# Patient Record
Sex: MALE | Race: BLACK OR AFRICAN AMERICAN | NOT HISPANIC OR LATINO | URBAN - METROPOLITAN AREA
[De-identification: names, ages, dates, MRNs, and addresses within clinical notes are randomized per-mention and may not be internally consistent; named-entity substitution may affect disease eponyms.]

---
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
## 2024-08-20 ENCOUNTER — EMERGENCY (EMERGENCY)
Facility: HOSPITAL | Age: 19
LOS: 0 days | Discharge: HOME | End: 2024-08-20
Attending: EMERGENCY MEDICINE
Payer: MEDICAID

## 2024-08-20 VITALS
DIASTOLIC BLOOD PRESSURE: 65 MMHG | OXYGEN SATURATION: 98 % | WEIGHT: 190.04 LBS | TEMPERATURE: 97 F | RESPIRATION RATE: 18 BRPM | HEART RATE: 89 BPM | SYSTOLIC BLOOD PRESSURE: 121 MMHG
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
## 2024-08-20 DIAGNOSIS — M25.512 PAIN IN LEFT SHOULDER: ICD-10-CM

## 2024-08-20 DIAGNOSIS — S43.005A UNSPECIFIED DISLOCATION OF LEFT SHOULDER JOINT, INITIAL ENCOUNTER: ICD-10-CM
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
## 2024-08-20 PROCEDURE — 73030 X-RAY EXAM OF SHOULDER: CPT | Mod: 26,LT

## 2024-08-20 PROCEDURE — 99284 EMERGENCY DEPT VISIT MOD MDM: CPT

## 2024-08-20 PROCEDURE — 99283 EMERGENCY DEPT VISIT LOW MDM: CPT | Mod: 25

## 2024-08-20 PROCEDURE — 73030 X-RAY EXAM OF SHOULDER: CPT | Mod: LT

## 2024-08-20 RX ORDER — IBUPROFEN 200 MG
600 TABLET ORAL ONCE
Refills: 0 | Status: COMPLETED | OUTPATIENT
Start: 2024-08-20 | End: 2024-08-20

## 2024-08-20 RX ADMIN — Medication 600 MILLIGRAM(S): at 16:50

## 2024-08-20 NOTE — ED PROVIDER NOTE - PHYSICAL EXAMINATION
generalized: alert, no distress  eyes: clear conjunctiva  msk: left shoulder- +anterior shoulder tenderness, no ac tenderness, no bony deformity, good rom of extremity, good cap refill, pulses distally in tact, no crepitation   skin: no bruising, no swelling, no lacerations   neuro: alert, oriented, no motor or sensory deficits, gait steady

## 2024-08-20 NOTE — ED PROVIDER NOTE - OBJECTIVE STATEMENT
17 y/o male presents to the ED with left shoulder pain after exercising with weights. patient denies any weakness or tingling to arm. patient right hand dominant. no neck or back pain . patient c/o pain with movement of shoulder.

## 2024-08-20 NOTE — ED ADULT NURSE NOTE - NSFALLUNIVINTERV_ED_ALL_ED
Bed/Stretcher in lowest position, wheels locked, appropriate side rails in place/Call bell, personal items and telephone in reach/Instruct patient to call for assistance before getting out of bed/chair/stretcher/Non-slip footwear applied when patient is off stretcher/[place] to call system/Physically safe environment - no spills, clutter or unnecessary equipment/Purposeful proactive rounding/Room/bathroom lighting operational, light cord in reach

## 2024-08-20 NOTE — ED PROVIDER NOTE - NSFOLLOWUPINSTRUCTIONS_ED_ALL_ED_FT
Our Emergency Department Referral Coordinators will be reaching out to you in the next 24-48 hours from 9:00am to 5:00pm to schedule a follow up appointment. Please expect a phone call from the hospital in that time frame. If you do not receive a call or if you have any questions or concerns, you can reach them at   [phone].      Shoulder Separation    A shoulder separation (acromioclavicular separation) is an injury to the connecting tissue (ligament) between the top of your shoulder blade (acromion) and your collarbone (clavicle).     The ligament may be stretched, partially torn, or completely torn.    A stretched ligament may not cause very much pain, and it does not move the collarbone out of place. A stretched ligament looks normal on an X-ray.  An injury that is a bit worse may partially tear a ligament and move the collarbone slightly out of place.  A serious injury completely tears both shoulder ligaments. This moves the collarbone severely out of position and changes the way that the shoulder looks (deformity).    CAUSES  The most common cause of a shoulder separation is falling on or receiving a blow to the top of the shoulder. Falling with an outstretched arm may also cause this injury.    RISK FACTORS  You may be at greater risk of a shoulder separation if:    You are male.  You are younger than age 35.  You play a contact sport, such as football or hockey.    SIGNS AND SYMPTOMS  The most common symptom of a shoulder separation is pain on the top of the shoulder after falling on it or receiving a blow to it. Other signs and symptoms include:    Shoulder deformity.  Swelling of the shoulder.  Decreased ability to move the shoulder.  Bruising on top of the shoulder.    DIAGNOSIS  Your health care provider may suspect a shoulder separation based on your symptoms and the details of a recent injury. A physical exam will be done. During this exam, the health care provider may:    Press on your shoulder.  Test the movement of your shoulder.  Ask you to hold a weight in your hand to see if the separation increases.  Do an X-ray.    TREATMENT  A stretch injury may require only a sling, pain medicine, and cold packs. This treatment may last for 2–12 weeks. You may also have physical therapy. A physical therapist will teach you to do daily exercises to strengthen your shoulder muscles and prevent stiffness.  A complete tear may require surgery to repair the torn ligament. After surgery, you will also require a sling, pain medicine, and cold packs. Recovery may take longer. You may also need more physical therapy.    HOME CARE INSTRUCTIONS  Take medicines only as directed by your health care provider.  Apply ice to the top of your shoulder:  Put ice in a plastic bag.  Place a towel between your skin and the bag.  Leave the ice on for 20 minutes, 2–3 times a day.  Wear your sling or splint as directed by your health care provider.   You may be able to remove your sling to do your physical therapy exercises.  Ask your health care provider when you can stop wearing the sling.  Do not do any activities that make your pain worse.  Do not lift anything that is heavier than 10 lb (4.5 kg) on the injured side of your body.  Ask your health care provider when you can return to athletic activities.    SEEK MEDICAL CARE IF:  Your pain medicine is not relieving your pain.  Your pain and stiffness are not improving after 2 weeks.  You are unable to do your physical therapy exercises because of pain or stiffness.    ADDITIONAL NOTES AND INSTRUCTIONS    Please follow up with your Primary MD in 24-48 hr.  Seek immediate medical care for any new/worsening signs or symptoms.

## 2024-08-20 NOTE — ED PROVIDER NOTE - PATIENT PORTAL LINK FT
You can access the FollowMyHealth Patient Portal offered by [facility] by registering at the following website: [email or web address]. By joining [employer]’s FollowMyHealth portal, you will also be able to view your health information using other applications (apps) compatible with our system.

## 2024-08-20 NOTE — ED PROVIDER NOTE - CLINICAL SUMMARY MEDICAL DECISION MAKING FREE TEXT BOX
X-ray was obtained.  Films interpreted.  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  Discussed with patient.  Recommend continue ice, Motrin as needed and sling placed.  Patient with follow-up orthopedics.

## 2024-08-21 PROBLEM — Z78.9 OTHER SPECIFIED HEALTH STATUS: Chronic | Status: ACTIVE | Noted: 2022-10-28

## 2024-08-23 ENCOUNTER — APPOINTMENT (OUTPATIENT)
Dept: ORTHOPEDIC SURGERY | Facility: CLINIC | Age: 19
End: 2024-08-23
Payer: MEDICAID

## 2024-08-23 VITALS — WEIGHT: 190 LBS | HEIGHT: 74 IN | BODY MASS INDEX: 24.38 KG/M2

## 2024-08-23 DIAGNOSIS — M25.512 PAIN IN LEFT SHOULDER: ICD-10-CM

## 2024-08-23 PROBLEM — Z00.00 ENCOUNTER FOR PREVENTIVE HEALTH EXAMINATION: Status: ACTIVE | Noted: 2024-08-23

## 2024-08-23 PROCEDURE — 99203 OFFICE O/P NEW LOW 30 MIN: CPT

## 2024-08-23 NOTE — ASSESSMENT
[FreeTextEntry1] : 18-year-old male here for evaluation of left shoulder pain.  Patient reports he was doing shoulders in the gym a few days ago.  Denies any specific incident of injury but reports of woken up with pain following his latest shoulder workout.  Denies any popping or clicking.  Denies any trauma or falls.  Denies any numbness or tingling.  Physical examination of the left shoulder: No swelling, ecchymosis, erythema appreciated.  Skin is intact.  No midline neck or paraspinal tenderness.  Mild tenderness of the anterior glenohumeral joint and lateral deltoid.  Good range of motion upon flexion and abduction of the left shoulder without any limitations.  4-5 strength.  Negative [place].  Negative [place]'s.  Negative drop arm testing.  Sensorimotor intact distally.  Neurovascularly intact.   X-rays of the left shoulder reviewed from the hospital system:  No acute fractures, subluxations, or dislocations.  Treatment plan as discussed:  My clinical suspicion is high for a tendinitis of the shoulder given the patient's history, physical examination findings, and x-ray findings.  I recommended anti-inflammatory medication. Patient agrees to taking Advil/Ibuprofen OTC as needed for pain. Benefits discussed. Confirmed no contraindication to NSAIDs.  Script for physical therapy provided for improved ROM and strengthening of surrounding musculature. Benefits discussed.  I recommended patient rest, ice, compress, and elevate the arm regularly. Encouraged activity modification as tolerable. Encouraged gentle range of motion to avoid stiffness.  All questions and concerns addressed to patient's satisfaction. Patient expresses full understanding of treatment plan. Patient will follow up with Peter in 6 weeks for further evaluation and treatment. will consider MRI in repeat evaluation if patient's symptoms do not improve.

## 2024-09-24 ENCOUNTER — APPOINTMENT (OUTPATIENT)
Dept: ORTHOPEDIC SURGERY | Facility: CLINIC | Age: 19
End: 2024-09-24

## 2025-02-06 ENCOUNTER — EMERGENCY (EMERGENCY)
Facility: HOSPITAL | Age: 20
LOS: 0 days | Discharge: HOME | End: 2025-02-06
Attending: EMERGENCY MEDICINE
Payer: MEDICAID

## 2025-02-06 VITALS
OXYGEN SATURATION: 97 % | SYSTOLIC BLOOD PRESSURE: 111 MMHG | DIASTOLIC BLOOD PRESSURE: 71 MMHG | RESPIRATION RATE: 16 BRPM | TEMPERATURE: 98 F | WEIGHT: 186.07 LBS | HEART RATE: 68 BPM

## 2025-02-06 VITALS
RESPIRATION RATE: 18 BRPM | DIASTOLIC BLOOD PRESSURE: 77 MMHG | OXYGEN SATURATION: 99 % | SYSTOLIC BLOOD PRESSURE: 125 MMHG | HEART RATE: 73 BPM | TEMPERATURE: 98 F

## 2025-02-06 DIAGNOSIS — J02.9 ACUTE PHARYNGITIS, UNSPECIFIED: ICD-10-CM

## 2025-02-06 DIAGNOSIS — R09.81 NASAL CONGESTION: ICD-10-CM

## 2025-02-06 DIAGNOSIS — R05.1 ACUTE COUGH: ICD-10-CM

## 2025-02-06 DIAGNOSIS — J06.9 ACUTE UPPER RESPIRATORY INFECTION, UNSPECIFIED: ICD-10-CM

## 2025-02-06 LAB
FLUAV AG NPH QL: SIGNIFICANT CHANGE UP
FLUBV AG NPH QL: SIGNIFICANT CHANGE UP
RSV RNA NPH QL NAA+NON-PROBE: SIGNIFICANT CHANGE UP
SARS-COV-2 RNA SPEC QL NAA+PROBE: SIGNIFICANT CHANGE UP

## 2025-02-06 PROCEDURE — 99283 EMERGENCY DEPT VISIT LOW MDM: CPT

## 2025-02-06 PROCEDURE — 0241U: CPT

## 2025-02-06 NOTE — ED PROVIDER NOTE - PATIENT PORTAL LINK FT
You can access the FollowMyHealth Patient Portal offered by [facility] by registering at the following website: [email or web address]. By joining [employer]’s FollowMyHealth portal, you will also be able to view your health information using other applications (apps) compatible with our system.

## 2025-02-06 NOTE — ED PROVIDER NOTE - OBJECTIVE STATEMENT
20 y/o male non smoker presents to the ED with cough, non productive, nasal congestion, sore throat over past few days. no fevers. patient states significant other with similar symptoms, given antibx. no rashes.

## 2025-02-06 NOTE — ED ADULT TRIAGE NOTE - CHIEF COMPLAINT QUOTE
pt in er for complaints of cough, sore throat, and stuffy nose for three days, pt stated cough is productive   pt stated his friend is sick with similar symptoms

## 2025-02-06 NOTE — ED PROVIDER NOTE - PHYSICAL EXAMINATION
Vital Signs: I have reviewed the initial vital signs.  Constitutional: well-nourished, no acute distress, normocephalic  Eyes: PERRLA, EOMI clear conjunctiva  ENT: MMM, TM b/l clear , oropharynx - clear wtihout exudates, no nasal congestion, no sinus tenderness  Respiratory: unlabored respiratory effort, clear to auscultation bilaterally  Musculoskeletal: supple neck,  no bony tenderness  Integumentary: warm, dry, no rash  Neurologic: awake, alert, steady gait

## 2025-08-30 ENCOUNTER — EMERGENCY (EMERGENCY)
Facility: HOSPITAL | Age: 20
LOS: 0 days | Discharge: HOME | End: 2025-08-30
Attending: EMERGENCY MEDICINE
Payer: MEDICAID

## 2025-08-30 VITALS
DIASTOLIC BLOOD PRESSURE: 76 MMHG | HEART RATE: 68 BPM | HEIGHT: 74 IN | TEMPERATURE: 98 F | WEIGHT: 195.11 LBS | SYSTOLIC BLOOD PRESSURE: 113 MMHG | RESPIRATION RATE: 19 BRPM
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
## 2025-08-30 DIAGNOSIS — M54.50 LOW BACK PAIN, UNSPECIFIED: ICD-10-CM
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
## 2025-08-30 PROCEDURE — 99284 EMERGENCY DEPT VISIT MOD MDM: CPT

## 2025-08-30 PROCEDURE — 99283 EMERGENCY DEPT VISIT LOW MDM: CPT

## 2025-08-30 RX ORDER — IBUPROFEN 200 MG
600 TABLET ORAL ONCE
Refills: 0 | Status: COMPLETED | OUTPATIENT
Start: 2025-08-30 | End: 2025-08-30

## 2025-08-30 RX ADMIN — Medication 600 MILLIGRAM(S): at 11:41
